# Patient Record
Sex: FEMALE | Race: WHITE | NOT HISPANIC OR LATINO | Employment: UNEMPLOYED
[De-identification: names, ages, dates, MRNs, and addresses within clinical notes are randomized per-mention and may not be internally consistent; named-entity substitution may affect disease eponyms.]

---

## 2019-03-19 ENCOUNTER — RECORDS - HEALTHEAST (OUTPATIENT)
Dept: ADMINISTRATIVE | Facility: OTHER | Age: 39
End: 2019-03-19

## 2019-08-25 ENCOUNTER — HOSPITAL ENCOUNTER (EMERGENCY)
Facility: CLINIC | Age: 39
Discharge: HOME | End: 2019-08-25
Attending: EMERGENCY MEDICINE | Admitting: EMERGENCY MEDICINE
Payer: COMMERCIAL

## 2019-08-25 ENCOUNTER — APPOINTMENT (OUTPATIENT)
Dept: GENERAL RADIOLOGY | Facility: CLINIC | Age: 39
End: 2019-08-25
Attending: EMERGENCY MEDICINE
Payer: COMMERCIAL

## 2019-08-25 VITALS
BODY MASS INDEX: 27.6 KG/M2 | OXYGEN SATURATION: 99 % | WEIGHT: 150 LBS | RESPIRATION RATE: 16 BRPM | DIASTOLIC BLOOD PRESSURE: 98 MMHG | HEART RATE: 92 BPM | HEIGHT: 62 IN | SYSTOLIC BLOOD PRESSURE: 159 MMHG | TEMPERATURE: 98.5 F

## 2019-08-25 DIAGNOSIS — F11.93 OPIOID WITHDRAWAL (H): ICD-10-CM

## 2019-08-25 LAB
ALBUMIN SERPL-MCNC: 3.8 G/DL (ref 3.4–5)
ALP SERPL-CCNC: 91 U/L (ref 40–150)
ALT SERPL W P-5'-P-CCNC: 25 U/L (ref 0–50)
ANION GAP SERPL CALCULATED.3IONS-SCNC: 4 MMOL/L (ref 3–14)
AST SERPL W P-5'-P-CCNC: 24 U/L (ref 0–45)
B-HCG FREE SERPL-ACNC: <5 IU/L
BASOPHILS # BLD AUTO: 0 10E9/L (ref 0–0.2)
BASOPHILS NFR BLD AUTO: 0.2 %
BILIRUB SERPL-MCNC: 0.2 MG/DL (ref 0.2–1.3)
BUN SERPL-MCNC: 8 MG/DL (ref 7–30)
CALCIUM SERPL-MCNC: 8.9 MG/DL (ref 8.5–10.1)
CHLORIDE SERPL-SCNC: 106 MMOL/L (ref 94–109)
CO2 SERPL-SCNC: 27 MMOL/L (ref 20–32)
CREAT SERPL-MCNC: 0.6 MG/DL (ref 0.52–1.04)
DIFFERENTIAL METHOD BLD: NORMAL
EOSINOPHIL # BLD AUTO: 0.1 10E9/L (ref 0–0.7)
EOSINOPHIL NFR BLD AUTO: 1.6 %
ERYTHROCYTE [DISTWIDTH] IN BLOOD BY AUTOMATED COUNT: 13.5 % (ref 10–15)
GFR SERPL CREATININE-BSD FRML MDRD: >90 ML/MIN/{1.73_M2}
GLUCOSE SERPL-MCNC: 98 MG/DL (ref 70–99)
HCT VFR BLD AUTO: 39.3 % (ref 35–47)
HGB BLD-MCNC: 13.2 G/DL (ref 11.7–15.7)
IMM GRANULOCYTES # BLD: 0 10E9/L (ref 0–0.4)
IMM GRANULOCYTES NFR BLD: 0.4 %
INTERPRETATION ECG - MUSE: NORMAL
LIPASE SERPL-CCNC: 38 U/L (ref 73–393)
LYMPHOCYTES # BLD AUTO: 1.6 10E9/L (ref 0.8–5.3)
LYMPHOCYTES NFR BLD AUTO: 28.8 %
MCH RBC QN AUTO: 26.9 PG (ref 26.5–33)
MCHC RBC AUTO-ENTMCNC: 33.6 G/DL (ref 31.5–36.5)
MCV RBC AUTO: 80 FL (ref 78–100)
MONOCYTES # BLD AUTO: 0.4 10E9/L (ref 0–1.3)
MONOCYTES NFR BLD AUTO: 7.4 %
NEUTROPHILS # BLD AUTO: 3.4 10E9/L (ref 1.6–8.3)
NEUTROPHILS NFR BLD AUTO: 61.6 %
NRBC # BLD AUTO: 0 10*3/UL
NRBC BLD AUTO-RTO: 0 /100
PLATELET # BLD AUTO: 263 10E9/L (ref 150–450)
POTASSIUM SERPL-SCNC: 3.7 MMOL/L (ref 3.4–5.3)
PROT SERPL-MCNC: 8.3 G/DL (ref 6.8–8.8)
RBC # BLD AUTO: 4.91 10E12/L (ref 3.8–5.2)
SODIUM SERPL-SCNC: 137 MMOL/L (ref 133–144)
TROPONIN I SERPL-MCNC: <0.015 UG/L (ref 0–0.04)
WBC # BLD AUTO: 5.5 10E9/L (ref 4–11)

## 2019-08-25 PROCEDURE — 84702 CHORIONIC GONADOTROPIN TEST: CPT

## 2019-08-25 PROCEDURE — 99285 EMERGENCY DEPT VISIT HI MDM: CPT | Mod: 25

## 2019-08-25 PROCEDURE — 84484 ASSAY OF TROPONIN QUANT: CPT | Performed by: EMERGENCY MEDICINE

## 2019-08-25 PROCEDURE — 71046 X-RAY EXAM CHEST 2 VIEWS: CPT

## 2019-08-25 PROCEDURE — 93005 ELECTROCARDIOGRAM TRACING: CPT

## 2019-08-25 PROCEDURE — 83690 ASSAY OF LIPASE: CPT | Performed by: EMERGENCY MEDICINE

## 2019-08-25 PROCEDURE — 96361 HYDRATE IV INFUSION ADD-ON: CPT

## 2019-08-25 PROCEDURE — 85025 COMPLETE CBC W/AUTO DIFF WBC: CPT | Performed by: EMERGENCY MEDICINE

## 2019-08-25 PROCEDURE — 25000128 H RX IP 250 OP 636: Performed by: EMERGENCY MEDICINE

## 2019-08-25 PROCEDURE — 80053 COMPREHEN METABOLIC PANEL: CPT | Performed by: EMERGENCY MEDICINE

## 2019-08-25 PROCEDURE — 96375 TX/PRO/DX INJ NEW DRUG ADDON: CPT

## 2019-08-25 PROCEDURE — 25000132 ZZH RX MED GY IP 250 OP 250 PS 637: Performed by: EMERGENCY MEDICINE

## 2019-08-25 PROCEDURE — 96374 THER/PROPH/DIAG INJ IV PUSH: CPT

## 2019-08-25 RX ORDER — METOCLOPRAMIDE HYDROCHLORIDE 5 MG/ML
5 INJECTION INTRAMUSCULAR; INTRAVENOUS ONCE
Status: COMPLETED | OUTPATIENT
Start: 2019-08-25 | End: 2019-08-25

## 2019-08-25 RX ORDER — PROMETHAZINE HYDROCHLORIDE 25 MG/1
25 TABLET ORAL EVERY 6 HOURS PRN
Qty: 20 TABLET | Refills: 0 | Status: SHIPPED | OUTPATIENT
Start: 2019-08-25 | End: 2019-08-30

## 2019-08-25 RX ORDER — CLONIDINE HYDROCHLORIDE 0.1 MG/1
0.1 TABLET ORAL 3 TIMES DAILY
Qty: 9 TABLET | Refills: 0 | Status: SHIPPED | OUTPATIENT
Start: 2019-08-25 | End: 2019-08-28

## 2019-08-25 RX ORDER — IBUPROFEN 600 MG/1
600 TABLET, FILM COATED ORAL EVERY 8 HOURS PRN
Qty: 15 TABLET | Refills: 0 | Status: SHIPPED | OUTPATIENT
Start: 2019-08-25 | End: 2019-08-30

## 2019-08-25 RX ORDER — SODIUM CHLORIDE 9 MG/ML
1000 INJECTION, SOLUTION INTRAVENOUS CONTINUOUS
Status: DISCONTINUED | OUTPATIENT
Start: 2019-08-25 | End: 2019-08-26 | Stop reason: HOSPADM

## 2019-08-25 RX ORDER — KETOROLAC TROMETHAMINE 15 MG/ML
15 INJECTION, SOLUTION INTRAMUSCULAR; INTRAVENOUS ONCE
Status: COMPLETED | OUTPATIENT
Start: 2019-08-25 | End: 2019-08-25

## 2019-08-25 RX ORDER — CLONIDINE HYDROCHLORIDE 0.1 MG/1
0.1 TABLET ORAL ONCE
Status: COMPLETED | OUTPATIENT
Start: 2019-08-25 | End: 2019-08-25

## 2019-08-25 RX ADMIN — CLONIDINE HYDROCHLORIDE 0.1 MG: 0.1 TABLET ORAL at 21:19

## 2019-08-25 RX ADMIN — KETOROLAC TROMETHAMINE 15 MG: 15 INJECTION, SOLUTION INTRAMUSCULAR; INTRAVENOUS at 21:18

## 2019-08-25 RX ADMIN — SODIUM CHLORIDE 1000 ML: 9 INJECTION, SOLUTION INTRAVENOUS at 21:16

## 2019-08-25 RX ADMIN — METOCLOPRAMIDE 5 MG: 5 INJECTION, SOLUTION INTRAMUSCULAR; INTRAVENOUS at 21:19

## 2019-08-25 ASSESSMENT — ENCOUNTER SYMPTOMS
NAUSEA: 1
VOMITING: 1
ABDOMINAL PAIN: 1
SHORTNESS OF BREATH: 0
DIARRHEA: 1
DIAPHORESIS: 0
FEVER: 0

## 2019-08-25 ASSESSMENT — MIFFLIN-ST. JEOR: SCORE: 1308.65

## 2019-08-25 NOTE — ED AVS SNAPSHOT
Emergency Department  [address] [place] [id number]  Phone:  [phone]  Fax:  [phone]                                    Carrie Buchanan   MRN: [id number]    Department:   Emergency Department   Date of Visit:  8/25/2019           After Visit Summary Signature Page    I have received my discharge instructions, and my questions have been answered. I have discussed any challenges I see with this plan with the nurse or doctor.    ..........................................................................................................................................  Patient/Patient Representative Signature      ..........................................................................................................................................  Patient Representative Print Name and Relationship to Patient    ..................................................               ................................................  Date                                   Time    ..........................................................................................................................................  Reviewed by Signature/Title    ...................................................              ..............................................  Date                                               Time          22EPIC Rev 08/18

## 2019-08-26 NOTE — ED PROVIDER NOTES
History   Chief Complaint:  Chest Pain    HPI   history supplemented by law enforcement official bedside as well as electronic chart review  Carrie Buchanan is a 39 year old female, with a history of IV heroin abuse, who presents from nursing home by EMS to the ED for evaluation of abdominal and chest pain chest pain. The patient reports having chest pain since noon and becoming increasingly worse as the day went on. She states the she has been having abdominal pain, diarrhea, nausea, and chills, which is consistent with her prior experiences of heroin withdrawal symptoms. She used heroin last night and has been in custody since then. She has had a  section before but no other abdominal surgeries. She has not consumed any alcohol in a long time. She is unclear of when her last period was due to her IUD.  She denies any fever, cough, shortness of breath, diaphoresis, or any other acute symptoms.  She is here because she would like to feel better.    Allergies:  Penicillins     Medications:    Albuterol  Norvasc  Mirena     Past Medical History:    PID  Insomnia  HPV  Hypertension  Heroin abuse  Alcoholism    Past Surgical History:    Bunionectomy   section  Cystectomy  [place] teeth extraction  Tympanostomy  Salpingectomy for ectopic pregnancy    Family History:    Hyperlipidemia  Stroke  DM  Hypertension  Ovarian cancer    Social History:  Smoking status: Yes  Alcohol use: Not currently  Drug use: Yes- Heroin      Review of Systems   Constitutional: Negative for diaphoresis and fever.   Respiratory: Negative for shortness of breath.    Cardiovascular: Positive for chest pain.   Gastrointestinal: Positive for abdominal pain, diarrhea, nausea and vomiting.   All other systems reviewed and are negative.    Physical Exam     Patient Vitals for the past 24 hrs:   BP Temp Temp src Pulse Heart Rate Resp SpO2 Height Weight   195 -- -- -- -- 96 -- -- -- --   19 2119 (!) 160/102 -- -- -- -- -- -- -- --  "  08/25/19 2021 (!) 153/106 98.5  F (36.9  C) Oral 103 -- 16 100 % 1.575 m (5' 2\") 68 kg (150 lb)     Physical Exam  General: Uncomfortable appearing woman sitting upright in room 27, law enforcement bedside  HENT: mucous membranes slightly dry, OP clear  CV: initially slightly tachycardic  Resp: clear throughout, normal effort, no crackles or wheezing  GI: Abdomen soft, mild diffuse tenderness, bowel sounds present, no guarding, no distention  MSK: no bony tenderness, no CVAT  Skin: appropriately warm and dry, no abdominal rash  Neuro: alert, clear speech, oriented   Psych: cooperative, good eye contact      Emergency Department Course   ECG (10:44:15):  Rate 92 bpm. TN interval 126. QRS duration 82. QT/QTc 362/447. P-R-T axes 26 30 51. Normal sinus rhythm. Septal infarct, age undetermined. Abnormal ECG. Interpreted at 2109 by Erik Barajas MD.    Imaging:  Radiographic findings were communicated with the patient who voiced understanding of the findings.    XR Chest, 2 views:  Clear lungs.    Imaging independently reviewed and agree with radiologist interpretation.    Laboratory:  CBC: WNL (WBC 5.5, HGB 13.2, )    CMP: WNL (Creatinine 0.60)    Troponin: <0.015    Lipase: 38 (L)    Interventions:  2116: NS 1L IV Bolus   2118: Toradol 15 mg IV  2119: Clonidine 0.1 mg PO  2119: Reglan 5 mg IV    Emergency Department Course:  The patient arrived in the emergency department via EMS.  Past medical records, nursing notes, and vitals reviewed.  2035: I performed an exam of the patient and obtained history, as documented above.  IV inserted and blood drawn.  The patient was sent for a chest x-ray while in the emergency department, findings above.    The patient was placed on continuous cardiac and pulse ox monitoring.    2150: I rechecked the patient. Explained findings to patient.    Findings and plan explained to the Patient. Patient discharged home with instructions regarding supportive care, " medications, and reasons to return. The importance of close follow-up was reviewed.     Impression & Plan    Medical Decision Making:  Presenting symptoms can be well explained by opioid withdrawal.  This is consistent with a regular opioid use that was abruptly discontinued last night upon her incarceration.  Alternate etiologies including pulmonary embolism, ACS, bowel obstruction, infectious gastroenteritis, and many others were considered but are thought to be sufficiently unlikely that further testing can be reasonably deferred.  She has a negative troponin after many hours of symptoms.  Her exam is benign.  She reported some improvement with treatments provided and was given a prescription for a short course of symptom medic medications that the  confirmed will be available to her upon discharge.  She is welcome back for crisis at any hour.    Diagnosis:    ICD-10-CM    1. Opioid withdrawal (H) F11.23      Disposition:  Discharged to home.    Discharge Medications:  New Prescriptions    CLONIDINE (CATAPRES) 0.1 MG TABLET    Take 1 tablet (0.1 mg) by mouth 3 times daily for 3 days    IBUPROFEN (ADVIL/MOTRIN) 600 MG TABLET    Take 1 tablet (600 mg) by mouth every 8 hours as needed for moderate pain    PROMETHAZINE (PHENERGAN) 25 MG TABLET    Take 1 tablet (25 mg) by mouth every 6 hours as needed for nausea     Guanaco Washington  8/25/2019    EMERGENCY DEPARTMENT  Scribe Disclosure:  I, Guanaco Washington, am serving as a scribe at 8:35 PM on 8/25/2019 to document services personally performed by Erik Barajas MD based on my observations and the provider's statements to me.    This record was created at least in part using electronic voice recognition software, so please excuse any typographical errors.           Erik Barajas MD  08/26/19 2032

## 2019-08-26 NOTE — ED TRIAGE NOTES
pt brought in by ambulance from longterm for chest pain, abdominal pain - pt states she is in heroin withdrawal, last used yesterday

## 2019-08-26 NOTE — ED NOTES
Bed: ED27  Expected date: 8/25/19  Expected time:   Means of arrival:   Comments:  518  39F  Withdrawls  2010

## 2020-04-28 ENCOUNTER — RECORDS - HEALTHEAST (OUTPATIENT)
Dept: ADMINISTRATIVE | Facility: OTHER | Age: 40
End: 2020-04-28

## 2020-04-29 ENCOUNTER — COMMUNICATION - HEALTHEAST (OUTPATIENT)
Dept: EMERGENCY MEDICINE | Facility: CLINIC | Age: 40
End: 2020-04-29

## 2020-10-14 ENCOUNTER — COMMUNICATION - HEALTHEAST (OUTPATIENT)
Dept: SCHEDULING | Facility: CLINIC | Age: 40
End: 2020-10-14

## 2021-05-28 ENCOUNTER — RECORDS - HEALTHEAST (OUTPATIENT)
Dept: ADMINISTRATIVE | Facility: CLINIC | Age: 41
End: 2021-05-28

## 2021-05-31 ENCOUNTER — RECORDS - HEALTHEAST (OUTPATIENT)
Dept: ADMINISTRATIVE | Facility: CLINIC | Age: 41
End: 2021-05-31

## 2021-06-02 ENCOUNTER — RECORDS - HEALTHEAST (OUTPATIENT)
Dept: ADMINISTRATIVE | Facility: CLINIC | Age: 41
End: 2021-06-02

## 2021-06-02 VITALS — BODY MASS INDEX: 28.35 KG/M2 | HEIGHT: 62 IN | BODY MASS INDEX: 28.35 KG/M2 | WEIGHT: 155 LBS

## 2021-06-20 NOTE — LETTER
Letter by Ronald Celis RN at      Author: Ronald Celis RN Service: -- Author Type: --    Filed:  Encounter Date: 4/29/2020 Status: (Other)       4/29/2020        Carrie Buchanan  [address]  [place] [place] [facility] [address]    This letter provides a written record that you were tested for COVID-19 on 4/28/20.     Your result was negative.    This means that we didnt find the virus that causes COVID-19 in your sample. A test may show negative when you do actually have the virus. This can happen when the virus is in the early stages of infection, before you feel illness symptoms.    Even if you dont have symptoms, they may still appear. For safety, its very important to follow these rules.    Keep yourself away from others (self-isolation):      Stay home. Dont go to work, school or anywhere else.     Stay away from others in your home. No hugging, kissing or shaking hands.    Dont let anyone visit.    Cover your mouth and nose with a mask, tissue or wash cloth to avoid spreading germs.    Stay in self-isolation until you meet ALL of the guidelines below:    1. You have had no fever for at least 72 hours (that is 3 full days of no fever without the use of medicine that reduces fevers), AND  2. other symptoms (such as cough, shortness of breath) have gotten better, AND  3. at least 7 days have passed since your symptoms first appeared.    Going back to work  Check with your employer for any guidelines to follow for going back to work.    Employers: This document serves as formal notice that your employee tested negative for COVID-19, as of the testing date shown above.    For questions regarding this letter or your Negative COVID-19 result, call [phone] between 8A to 6:30P (M-F) and 10A to 6:30P (weekends).

## 2021-09-04 ENCOUNTER — HEALTH MAINTENANCE LETTER (OUTPATIENT)
Age: 41
End: 2021-09-04

## 2022-10-16 ENCOUNTER — HEALTH MAINTENANCE LETTER (OUTPATIENT)
Age: 42
End: 2022-10-16

## 2023-11-04 ENCOUNTER — HEALTH MAINTENANCE LETTER (OUTPATIENT)
Age: 43
End: 2023-11-04

## 2024-03-23 ENCOUNTER — HEALTH MAINTENANCE LETTER (OUTPATIENT)
Age: 44
End: 2024-03-23